# Patient Record
(demographics unavailable — no encounter records)

---

## 2024-11-27 NOTE — PHYSICAL EXAM

## 2024-11-27 NOTE — HISTORY OF PRESENT ILLNESS
[FreeTextEntry1] : pt is a 72 y/o woman hx present for arthroscopy right knee dr guerra .pt s/p bunionectomy 2/24 did well .pt doing well no other complaints other then kneee pain pt denies any chest  pain dizziness ,lightheadedness ,nausea vomiting diaphoresis